# Patient Record
Sex: FEMALE | URBAN - METROPOLITAN AREA
[De-identification: names, ages, dates, MRNs, and addresses within clinical notes are randomized per-mention and may not be internally consistent; named-entity substitution may affect disease eponyms.]

---

## 2022-05-05 ENCOUNTER — TELEPHONE (OUTPATIENT)
Dept: FAMILY MEDICINE CLINIC | Facility: CLINIC | Age: 52
End: 2022-05-05

## 2022-05-05 NOTE — TELEPHONE ENCOUNTER
Will  consider seeing her as a new patient? He already see's her  & son. Please let patient know  No future appointments.

## 2022-05-25 ENCOUNTER — TELEPHONE (OUTPATIENT)
Dept: FAMILY MEDICINE CLINIC | Facility: CLINIC | Age: 52
End: 2022-05-25

## 2022-05-25 NOTE — TELEPHONE ENCOUNTER
Patient received email regarding Care Gaps. [facility] Will be faxing over the results for chart here.

## 2022-05-26 LAB
ALBUMIN: 4.6 G/DL (ref 3.8–5.2)
ALKALINE PHOSPHATASE: 68 (ref 30–125)
ALT (SGPT): 15 IU/L (ref 0–45)
AST (SGOT): 15 IU/L (ref 0–33)
BILIRUBIN, TOTAL: 0.5 MG/DL (ref 0.2–1.2)
BUN: 18 (ref 9–26)
CEA: 7.4 NG/ML (ref 0–2.5)
CHOLESTEROL: 256 (ref 140–199)
CREATININE, URINE: 58.3 MG/DL (ref 27–260)
CREATININE: 0.7 MG/DL (ref 0.6–1.3)
GGT: 25 (ref 0–45)
GLOBULIN: 2.6 (ref 1.9–3.7)
GLUCOSE: 90 (ref 60–109)
HDL CHOLESTEROL: 47 MG/DL (ref 35–100)
HEMOGLOBIN A1C: 5.2 % (ref 3–6)
PROTEIN URINE: 6 MG/DL (ref 0–30)
PROTEIN, TOTAL: 7.2 (ref 6.1–8.2)
T. CHOL/HDL RATIO: 5.4 RATIO UNITS (ref 0–4.99)
TRIGLYCERIDES: 538 MG/DL (ref 0–150)
URINE PROTEIN/CREATININE RATIO, RANDOM: 0.1 (ref 0–0.2)

## 2022-05-27 NOTE — TELEPHONE ENCOUNTER
Labs to be reviewed at upcoming Physical with MD, has not yet established care in office; labs ordered by outside provider.

## 2022-05-31 ENCOUNTER — TELEPHONE (OUTPATIENT)
Dept: FAMILY MEDICINE CLINIC | Facility: CLINIC | Age: 52
End: 2022-05-31

## 2022-05-31 NOTE — TELEPHONE ENCOUNTER
Future Appointments   Date Time Provider Lucy Rehman   6/14/2022  8:00 AM Kilo Mg MD [facility]

## 2022-05-31 NOTE — TELEPHONE ENCOUNTER
Patient has not been seen here to establish care at this time. Does patient have a copy of labs. Can she bring to office for me to review prior to upcoming appointment ?

## 2022-05-31 NOTE — TELEPHONE ENCOUNTER
Future Appointments   Date Time Provider Lucy Sherie   7/22/2022  1:30 PM Martha Moreno MD [facility] [place]     Failed life ins blood test.   Concerned about CEA test.    Also would like to get into see provider before 7/22/2022.

## 2022-06-14 ENCOUNTER — OFFICE VISIT (OUTPATIENT)
Dept: FAMILY MEDICINE CLINIC | Facility: CLINIC | Age: 52
End: 2022-06-14
Payer: COMMERCIAL

## 2022-06-14 ENCOUNTER — TELEPHONE (OUTPATIENT)
Dept: FAMILY MEDICINE CLINIC | Facility: CLINIC | Age: 52
End: 2022-06-14

## 2022-06-14 VITALS
RESPIRATION RATE: 20 BRPM | OXYGEN SATURATION: 94 % | HEART RATE: 117 BPM | TEMPERATURE: 98 F | SYSTOLIC BLOOD PRESSURE: 138 MMHG | BODY MASS INDEX: 33.43 KG/M2 | HEIGHT: 66.75 IN | DIASTOLIC BLOOD PRESSURE: 88 MMHG | WEIGHT: 213 LBS

## 2022-06-14 DIAGNOSIS — F17.210 CIGARETTE NICOTINE DEPENDENCE WITHOUT COMPLICATION: ICD-10-CM

## 2022-06-14 DIAGNOSIS — R97.0 ELEVATED CEA: Primary | ICD-10-CM

## 2022-06-14 DIAGNOSIS — R05.3 CHRONIC COUGH: ICD-10-CM

## 2022-06-14 PROCEDURE — 3075F SYST BP GE 130 - 139MM HG: CPT | Performed by: FAMILY MEDICINE

## 2022-06-14 PROCEDURE — 3079F DIAST BP 80-89 MM HG: CPT | Performed by: FAMILY MEDICINE

## 2022-06-14 PROCEDURE — 3008F BODY MASS INDEX DOCD: CPT | Performed by: FAMILY MEDICINE

## 2022-06-14 PROCEDURE — 99204 OFFICE O/P NEW MOD 45 MIN: CPT | Performed by: FAMILY MEDICINE

## 2022-06-14 NOTE — PATIENT INSTRUCTIONS
Good exam       Obtain repeat of lab testing - CEA and CBC     Obtain chest xray     Will forward orders to [address].

## 2022-06-20 ENCOUNTER — TELEPHONE (OUTPATIENT)
Dept: FAMILY MEDICINE CLINIC | Facility: CLINIC | Age: 52
End: 2022-06-20

## 2022-06-20 DIAGNOSIS — F17.210 CIGARETTE NICOTINE DEPENDENCE WITHOUT COMPLICATION: ICD-10-CM

## 2022-06-20 DIAGNOSIS — R05.3 CHRONIC COUGH: ICD-10-CM

## 2022-06-20 DIAGNOSIS — R97.0 ELEVATED CEA: Primary | ICD-10-CM

## 2022-06-20 NOTE — TELEPHONE ENCOUNTER
Spoke to patient, she will call her insurance company for names of medical oncologist.    She is inquiring about her chest xray results and labs.

## 2022-06-21 ENCOUNTER — TELEPHONE (OUTPATIENT)
Dept: FAMILY MEDICINE CLINIC | Facility: CLINIC | Age: 52
End: 2022-06-21

## 2022-06-21 NOTE — TELEPHONE ENCOUNTER
Pt notified of Dr. Emily Guallpa and verbalized understanding. CT chest faxed to [facility] ([facility]) in [facility].

## 2022-06-21 NOTE — TELEPHONE ENCOUNTER
Chart reviewed. CEA level has risen. Chest x-ray report negative. With patient's history of smoking and elevated CEA recommend CT scan of the chest with contrast.    Recommend referral to oncology. (Patient with insurance through "[employer].   Okay to have patient find medical oncology through her insurance.)

## 2022-06-21 NOTE — TELEPHONE ENCOUNTER
wants to see if CT order was sent to [facility] ([facility]) to have done there

## 2022-06-23 ENCOUNTER — TELEPHONE (OUTPATIENT)
Dept: FAMILY MEDICINE CLINIC | Facility: CLINIC | Age: 52
End: 2022-06-23

## 2022-06-23 DIAGNOSIS — F17.210 CIGARETTE NICOTINE DEPENDENCE WITHOUT COMPLICATION: ICD-10-CM

## 2022-06-23 DIAGNOSIS — R97.0 ELEVATED CEA: Primary | ICD-10-CM

## 2022-06-23 DIAGNOSIS — R05.3 CHRONIC COUGH: ICD-10-CM

## 2022-06-23 NOTE — TELEPHONE ENCOUNTER
Printed and faxed CT order to [facility] ([facility]) @ [phone]  Fax confirmation received. Pt found an oncologist she would like to see, Dr. Elham Balderas. Phone [phone]  Fax - [phone]    Please place referral, will let pt know when completed.

## 2022-06-23 NOTE — TELEPHONE ENCOUNTER
pt needs CT order sent to [facility] ([facility]) at [phone], also has other questions for nurse

## 2022-06-24 ENCOUNTER — PATIENT MESSAGE (OUTPATIENT)
Dept: FAMILY MEDICINE CLINIC | Facility: CLINIC | Age: 52
End: 2022-06-24

## 2022-06-24 ENCOUNTER — TELEPHONE (OUTPATIENT)
Dept: FAMILY MEDICINE CLINIC | Facility: CLINIC | Age: 52
End: 2022-06-24

## 2022-06-24 ENCOUNTER — TELEPHONE (OUTPATIENT)
Dept: CASE MANAGEMENT | Age: 52
End: 2022-06-24

## 2022-06-24 NOTE — TELEPHONE ENCOUNTER
Attempted to fax on different fax machine. Continuing to show as failure to send. Waiting on phone call back from Dr. Juana Henderson.

## 2022-06-24 NOTE — TELEPHONE ENCOUNTER
From: Stephanie Welsh  Sent: 6/24/2022 10:14 AM CDT  To: Frankie Muller Staff      Dr. Hua Carson just returned my call. Their fax number is [phone]. She said as soon as they receive Dr. Daren Ward referral she will get me an appointment. The CT is scheduled for 7/1/22. The finally received a legible fax yesterday. They were not sure if it was their machine or your machine. Does not matter as long as we are moving forward. Thank you again for your help.

## 2022-06-24 NOTE — TELEPHONE ENCOUNTER
Hello     We need health plan updated in the chart. We cannot move forward with authorizations with out this information.     Thank you,  [facility]   Referral specialist

## 2022-06-24 NOTE — TELEPHONE ENCOUNTER
Pt notified that fax does not seem to be going through. Please see telephone note from earlier today.

## 2022-06-24 NOTE — TELEPHONE ENCOUNTER
Fax showing as \"failure. \" [facility] office. Verified that fax number is correct. Was transferred to [facility] voicemail - left message to verify that fax was received and to call back. Referral refaxed.

## 2022-07-06 ENCOUNTER — PATIENT MESSAGE (OUTPATIENT)
Dept: FAMILY MEDICINE CLINIC | Facility: CLINIC | Age: 52
End: 2022-07-06

## 2022-07-06 NOTE — TELEPHONE ENCOUNTER
CT report reviewed - no findings to identified cause for elevated CEA. Recommend continue with proceeding with plan for oncology/ hematology evaluation.

## 2022-07-06 NOTE — TELEPHONE ENCOUNTER
From: Karla Cheema  To: Edna Henson MD  Sent: 7/6/2022 4:47 PM CDT  Subject: CT results are back     Just wondering what the results mean and what are the next steps.

## 2022-07-06 NOTE — TELEPHONE ENCOUNTER
Pt notified of results and plan per Dr. Ney Valencia's note. Pt verbalized understanding and will follow up with heme/onc physician.

## 2022-08-02 ENCOUNTER — TELEPHONE (OUTPATIENT)
Dept: FAMILY MEDICINE CLINIC | Facility: CLINIC | Age: 52
End: 2022-08-02

## 2022-08-02 NOTE — TELEPHONE ENCOUNTER
Received request to send last 5 years of medical records to [address] [facility],[address], [address] [facility], [address], [place] [address].  Sent to [employer]

## 2022-11-22 ENCOUNTER — OFFICE VISIT (OUTPATIENT)
Dept: FAMILY MEDICINE CLINIC | Facility: CLINIC | Age: 52
End: 2022-11-22

## 2022-11-22 VITALS
RESPIRATION RATE: 18 BRPM | HEART RATE: 102 BPM | HEIGHT: 66.75 IN | DIASTOLIC BLOOD PRESSURE: 78 MMHG | WEIGHT: 216.81 LBS | BODY MASS INDEX: 34.03 KG/M2 | TEMPERATURE: 99 F | OXYGEN SATURATION: 98 % | SYSTOLIC BLOOD PRESSURE: 126 MMHG

## 2022-11-22 DIAGNOSIS — R97.0 ELEVATED CEA: Primary | ICD-10-CM

## 2022-11-22 DIAGNOSIS — E78.00 PURE HYPERCHOLESTEROLEMIA: ICD-10-CM

## 2022-11-22 PROCEDURE — 99214 OFFICE O/P EST MOD 30 MIN: CPT | Performed by: FAMILY MEDICINE

## 2022-11-22 NOTE — PATIENT INSTRUCTIONS
Good exam      Continue with work on low cholesterol / low fat diet     Continue with work on smoking cessation .        Recheck of labs in Feb/ March       Plan for recheck of appointment in 6 -  12 months

## 2023-02-14 ENCOUNTER — TELEPHONE (OUTPATIENT)
Dept: FAMILY MEDICINE CLINIC | Facility: CLINIC | Age: 53
End: 2023-02-14

## 2023-02-14 NOTE — TELEPHONE ENCOUNTER
Received fax from [facility] 2/14/23    Lab results entered 2/14/23

## 2023-02-15 ENCOUNTER — PATIENT MESSAGE (OUTPATIENT)
Dept: FAMILY MEDICINE CLINIC | Facility: CLINIC | Age: 53
End: 2023-02-15

## 2023-02-15 LAB
CEA: 11.5 (ref 0–4.7)
CHOL/HDL RATIO: 6.4 (ref 3.7–5.6)
HCT: 47 (ref 34–45)
HDL CHOLESTEROL: 40 MG/DL (ref 40–?)
HGB: 15.3 (ref 11.3–15.4)
LDL CHOLESTEROL: 137 MG/DL (ref 0–98)
MEAN CELL VOLUME: 93.4 (ref 79–100)
MEAN CORPUSCULAR HEMOGLOBIN: 30.4 (ref 26–34)
MEAN CORPUSCULAR HGB CONC: 32.6 (ref 31.9–35.8)
MEAN PLATELET VOLUME: 9.5 (ref 8.9–12.6)
NRBC ABSOLUTE: 0 (ref 0–0.01)
NRBC%: 0 (ref 0–0.2)
PLT: 338 (ref 130–370)
RED BLOOD COUNT: 5.03 (ref 3.6–5.1)
RED CELL DISTRIBUTION WIDTH: 12.6 (ref 12–15.2)
TOTAL CHOLESTEROL: 254 MG/DL (ref ?–200)
TRIGLYCERIDES: 386 MG/DL (ref 0–149)
WBC: 7.4 (ref 3.6–12.9)

## 2023-02-15 NOTE — TELEPHONE ENCOUNTER
Per [employer], pt wanting to know what next steps are? This RN already advised to follow up with Dr. Champagne First per recommendation on lab results.

## 2023-02-15 NOTE — TELEPHONE ENCOUNTER
From: Alfred Rocha  To: Mal Baugh MD  Sent: 2/15/2023 12:50 PM CST  Subject: Test results are in     Just circling around - What is our next step? now that the test results are in.